# Patient Record
Sex: MALE | Race: WHITE | Employment: FULL TIME | URBAN - METROPOLITAN AREA
[De-identification: names, ages, dates, MRNs, and addresses within clinical notes are randomized per-mention and may not be internally consistent; named-entity substitution may affect disease eponyms.]

---

## 2017-04-18 ENCOUNTER — HOSPITAL ENCOUNTER (OUTPATIENT)
Dept: ULTRASOUND IMAGING | Age: 67
Discharge: HOME | End: 2017-04-18
Attending: INTERNAL MEDICINE
Payer: COMMERCIAL

## 2017-04-18 DIAGNOSIS — N18.30 CHRONIC KIDNEY DISEASE, STAGE III (MODERATE) (HCC): ICD-10-CM

## 2017-04-18 PROCEDURE — 76770 US EXAM ABDO BACK WALL COMP: CPT

## 2017-04-22 ENCOUNTER — HOSPITAL ENCOUNTER (EMERGENCY)
Age: 67
Discharge: HOME | End: 2017-04-22
Attending: FAMILY MEDICINE

## 2017-04-22 VITALS
BODY MASS INDEX: 25.68 KG/M2 | SYSTOLIC BLOOD PRESSURE: 135 MMHG | DIASTOLIC BLOOD PRESSURE: 75 MMHG | TEMPERATURE: 95.5 F | OXYGEN SATURATION: 96 % | RESPIRATION RATE: 18 BRPM | HEIGHT: 66 IN | HEART RATE: 85 BPM | WEIGHT: 159.8 LBS

## 2017-04-22 DIAGNOSIS — S05.01XA CORNEAL ABRASION, RIGHT, INITIAL ENCOUNTER: ICD-10-CM

## 2017-04-22 DIAGNOSIS — H53.8 BLURRED VISION, RIGHT EYE: Primary | ICD-10-CM

## 2017-04-22 RX ORDER — SULFACETAMIDE SODIUM 100 MG/ML
1-2 SOLUTION/ DROPS OPHTHALMIC EVERY 4 HOURS
Qty: 1 BOTTLE | Refills: 0 | Status: SHIPPED | OUTPATIENT
Start: 2017-04-22 | End: 2017-04-27

## 2017-04-22 NOTE — UC PROVIDER NOTE
Patient is a 61 y.o. male presenting with visual problem. The history is provided by the patient. Visual Problems    This is a new problem. The current episode started 3 to 5 hours ago. The problem occurs constantly. The problem has not changed since onset. The right eye is affected. The injury mechanism was none. The patient is experiencing no pain. There is no history of trauma to the eye. There is no known exposure to pink eye. He wears contacts. Associated symptoms include blurred vision. Pertinent negatives include no numbness, no decreased vision, no discharge, no double vision, no foreign body sensation, no photophobia, no eye redness, no nausea, no vomiting, no tingling, no weakness, no itching, no fever, no pain, no blindness and no head injury. Past Medical History:   Diagnosis Date    Hypercholesteremia     Hypertension         History reviewed. No pertinent surgical history. History reviewed. No pertinent family history. Social History     Social History    Marital status:      Spouse name: N/A    Number of children: N/A    Years of education: N/A     Occupational History    Not on file. Social History Main Topics    Smoking status: Never Smoker    Smokeless tobacco: Not on file    Alcohol use Not on file    Drug use: Not on file    Sexual activity: Not on file     Other Topics Concern    Not on file     Social History Narrative    No narrative on file                ALLERGIES: Review of patient's allergies indicates no known allergies. Review of Systems   Constitutional: Negative for fever. Eyes: Positive for blurred vision. Negative for blindness, double vision, photophobia, pain, discharge and redness. Gastrointestinal: Negative for nausea and vomiting. Skin: Negative for itching. Neurological: Negative for tingling, weakness and numbness.        Vitals:    04/22/17 0923   BP: 135/75   Pulse: 85   Resp: 18   Temp: 95.5 °F (35.3 °C)   SpO2: 96% Weight: 72.5 kg (159 lb 12.8 oz)   Height: 5' 6\" (1.676 m)       Physical Exam   Constitutional: He is oriented to person, place, and time. He appears well-developed and well-nourished. Eyes: Conjunctivae and EOM are normal.   Small abrasion noted at 12:00 position of right cornea after staining   Pulmonary/Chest: Effort normal.   Neurological: He is alert and oriented to person, place, and time. Skin: Skin is warm and dry. Psychiatric: He has a normal mood and affect. His behavior is normal. Thought content normal.   Nursing note and vitals reviewed. MDM     Differential Diagnosis; Clinical Impression; Plan:     CLINICAL IMPRESSION:  Blurred vision, right eye  (primary encounter diagnosis)  Corneal abrasion, right, initial encounter    Plan:  1. Bleph 10  2. Pt has appointment with [facility] but will wait to see his own ophthalmologist in 2 days  3. Risk of Significant Complications, Morbidity, and/or Mortality:   Presenting problems: Moderate  Diagnostic procedures: Moderate  Management options:   Moderate  Progress:   Patient progress:  Stable      Procedures

## 2018-01-31 ENCOUNTER — HOSPITAL ENCOUNTER (OUTPATIENT)
Dept: GENERAL RADIOLOGY | Age: 68
Discharge: HOME | End: 2018-01-31
Payer: MEDICARE

## 2018-01-31 DIAGNOSIS — M54.50 LUMBAGO: ICD-10-CM

## 2018-01-31 PROCEDURE — 72052 X-RAY EXAM NECK SPINE 6/>VWS: CPT

## 2018-03-06 ENCOUNTER — HOSPITAL ENCOUNTER (OUTPATIENT)
Dept: MRI IMAGING | Age: 68
Discharge: HOME | End: 2018-03-06
Attending: PAIN MEDICINE
Payer: MEDICARE

## 2018-03-06 DIAGNOSIS — M47.892 OTHER SPONDYLOSIS, CERVICAL REGION: ICD-10-CM

## 2018-03-06 DIAGNOSIS — M43.12 SPONDYLOLISTHESIS OF CERVICAL REGION: ICD-10-CM

## 2018-03-06 PROCEDURE — 72141 MRI NECK SPINE W/O DYE: CPT

## 2022-06-24 ENCOUNTER — OFFICE VISIT (OUTPATIENT)
Dept: ORTHOPEDIC SURGERY | Age: 72
End: 2022-06-24

## 2022-06-24 VITALS — HEIGHT: 66 IN | WEIGHT: 162 LBS | BODY MASS INDEX: 26.03 KG/M2

## 2022-06-24 DIAGNOSIS — M79.642 LEFT HAND PAIN: Primary | ICD-10-CM

## 2022-06-24 DIAGNOSIS — M19.042 ARTHRITIS OF FINGER OF LEFT HAND: ICD-10-CM

## 2022-06-24 PROCEDURE — 99203 OFFICE O/P NEW LOW 30 MIN: CPT | Performed by: ORTHOPAEDIC SURGERY

## 2022-06-24 PROCEDURE — 1123F ACP DISCUSS/DSCN MKR DOCD: CPT | Performed by: ORTHOPAEDIC SURGERY

## 2022-06-24 RX ORDER — AMLODIPINE BESYLATE 10 MG/1
TABLET ORAL
COMMUNITY
Start: 2022-04-16

## 2022-06-24 RX ORDER — DOXAZOSIN 2 MG/1
TABLET ORAL
COMMUNITY
Start: 2022-05-20

## 2022-06-24 RX ORDER — ERGOCALCIFEROL 1.25 MG/1
CAPSULE ORAL
COMMUNITY
Start: 2022-05-10

## 2022-06-24 RX ORDER — FENOFIBRATE 160 MG/1
TABLET ORAL
COMMUNITY
Start: 2022-04-22

## 2022-06-24 RX ORDER — AZILSARTAN KAMEDOXOMIL AND CHLORTHALIDONE 40; 25 MG/1; MG/1
TABLET ORAL
COMMUNITY
Start: 2022-04-13

## 2022-06-24 RX ORDER — PANTOPRAZOLE SODIUM 40 MG/1
TABLET, DELAYED RELEASE ORAL
COMMUNITY
Start: 2022-05-10

## 2022-06-24 RX ORDER — LORATADINE 10 MG/1
TABLET ORAL
COMMUNITY
Start: 2022-05-09

## 2022-06-24 NOTE — PROGRESS NOTES
Jillian Salinas (: 1950) is a 73 y.o. male patient here for evaluation of the following chief complaint(s):  Hand Pain (left hand)       ASSESSMENT/PLAN:  Below is the assessment and plan developed based on review of pertinent history, physical exam, labs, studies, and medications. 1. Left hand pain  -     XR HAND LT AP/LAT; Future  2. 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 For the small finger he does have some soft tissue swelling around the PIP joint and has a 10 degree flexion contracture. He is able to make a full composite fist however. When he attempts to flex the digits fully with the MP joints extended he has some limited range of motion of the PIP joint. He has minimal pain he describes in this joint as well. We discussed treatment options and he states he was referred for possible steroid injection however today he states his pain is minimal in that joint. I discussed risks of steroid injection and other treatment options such as a hand therapy referral.  For now he states he just wants to observe it and I did  him that I am happy to see him back if pain gets worse and we can plan for steroid injection at that time. It is possible that flexion contracture can get worse over time and if he notices that I did send him to hand therapy for referral.    In the meantime he can also use over-the-counter medications if needed for pain control. Patient verbalized understanding and elected to proceed. All questions were answered to the patient's apparent satisfaction. SUBJECTIVE/OBJECTIVE:  HPI    62-year-old male reports the left small finger has some limited range of motion and intermittent pain. Patient reports a gradual onset of symptoms. Duration of problem 4 years, began 2018.   Symptom Severity /10  Symptom Frequency constant        No Known Allergies    Current Outpatient Medications   Medication Sig    amLODIPine (NORVASC) 10 mg tablet     Edarbyclor 40-25 mg per tablet     doxazosin (CARDURA) 2 mg tablet     ergocalciferol (ERGOCALCIFEROL) 1,250 mcg (50,000 unit) capsule     fenofibrate (LOFIBRA) 160 mg tablet     loratadine (CLARITIN) 10 mg tablet     pantoprazole (PROTONIX) 40 mg tablet     OTHER BP, acid reflux, cholesterol meds     No current facility-administered medications for this visit. Social History     Socioeconomic History    Marital status:      Spouse name: Not on file    Number of children: Not on file    Years of education: Not on file    Highest education level: Not on file   Occupational History    Not on file   Tobacco Use    Smoking status: Never Smoker    Smokeless tobacco: Never Used   Vaping Use    Vaping Use: Never used   Substance and Sexual Activity    Alcohol use: Yes    Drug use: Never    Sexual activity: Not on file   Other Topics Concern    Not on file   Social History Narrative    Not on file     Social Determinants of Health     Financial Resource Strain:     Difficulty of Paying Living Expenses: Not on file   Food Insecurity:     Worried About Running Out of Food in the Last Year: Not on file    Teagan of Food in the Last Year: Not on file   Transportation Needs:     Lack of Transportation (Medical): Not on file    Lack of Transportation (Non-Medical):  Not on file   Physical Activity:     Days of Exercise per Week: Not on file    Minutes of Exercise per Session: Not on file   Stress:     Feeling of Stress : Not on file   Social Connections:     Frequency of Communication with Friends and Family: Not on file    Frequency of Social Gatherings with Friends and Family: Not on file    Attends [employer] Services: Not on file    Active Member of Clubs or Organizations: Not on file    Attends Club or Organization Meetings: Not on file    Marital Status: Not on file   Intimate Partner Violence:     Fear of Current or Ex-Partner: Not on file    Emotionally Abused: Not on file    Physically Abused: Not on file    Sexually Abused: Not on file   Housing Stability:     Unable to Pay for Housing in the Last Year: Not on file    Number of Places Lived in the Last Year: Not on file    Unstable Housing in the Last Year: Not on file       History reviewed. No pertinent surgical history. History reviewed. No pertinent family history. Review of Systems    No flowsheet data found. Vitals:  Ht 5' 6\" (1.676 m)   Wt 162 lb (73.5 kg)   BMI 26.15 kg/m²    Estimated body surface area is 1.85 meters squared as calculated from the following:    Height as of this encounter: 5' 6\" (1.676 m). Weight as of this encounter: 162 lb (73.5 kg). Body mass index is 26.15 kg/m². Physical Exam    Musculoskeletal Exam:    Left Upper Extremity EXAMINATION    Patient has very mild tenderness to palpation around the PIP joint and some mild swelling compared to the contralateral side. No crepitus with range of motion. No redness, warmth or erythema. No palpable masses. He does have a 10 degree flexion contracture of that digit. He is able to make a full composite fist.  With the MP joints extended he lacks some PIP flexion. Patient fires AIN, PIN and ulnar nerves. Sensation is grossly intact in the median, radial and ulnar distribution. Hand is pink and appears well-perfused. Hand is warm. Skin is intact. Compartments are soft and compressible.       Consitutional: Healthy  Skin:   - Edema - none  - Cellulitis - No    Neuro: Numbness or tingling in R/L arm: No    Psych: Affect normal    Cardiovascular: Capillary Refill < 2 seconds in upper extremities    Respiratory: Non-Labored Breathing    ROS:    Constitutional: Denies fever/chills    Respiratory: Denies SOB        Imaging:    XR Results (most recent):  Results from Appointment encounter on 06/24/22    XR HAND LT AP/LAT    Narrative  Left Hand Xray  Indication: pain  Views: 2 views, AP/LAT    Interpretation: 2 views of the left hand are reviewed and show severe CMC arthritis with osteophyte formation and cartilage loss. At the PIP joint of the small finger there is some mild soft tissue swelling but no significant arthritis is noted, on the lateral view may be some mild joint space loss but again very mild. There are no fractures or dislocations noted. The carpal alignment is well-maintained with no obvious abnormalities noted. There is no evidence indicating obvious ligament tear noted on these x-rays. Normal bone architecture is noted. Orders Placed This Encounter    XR HAND LT AP/LAT     Standing Status:   Future     Number of Occurrences:   1     Standing Expiration Date:   6/25/2023          An electronic signature was used to authenticate this note.   -- Cami Armstrong MD

## 2022-06-24 NOTE — LETTER
6/24/2022    Patient: Martha Rosenbaum   YOB: 1950   Date of Visit: 6/24/2022     Roseann Brady MD  [address] [facility] [address]  Via Fax: [phone]    Dear Roseann Brady MD,      Thank you for referring Mr. Martha Rosenbaum to [facility] for evaluation. My notes for this consultation are attached. If you have questions, please do not hesitate to call me. I look forward to following your patient along with you.       Sincerely,    Giovanny Navarro MD

## 2023-06-01 ENCOUNTER — HOSPITAL ENCOUNTER (OUTPATIENT)
Facility: HOSPITAL | Age: 73
Setting detail: OUTPATIENT SURGERY
Discharge: HOME | End: 2023-06-01
Attending: SPECIALIST | Admitting: SPECIALIST
Payer: MEDICARE

## 2023-06-01 ENCOUNTER — ANESTHESIA EVENT (OUTPATIENT)
Facility: HOSPITAL | Age: 73
End: 2023-06-01
Payer: MEDICARE

## 2023-06-01 ENCOUNTER — ANESTHESIA (OUTPATIENT)
Facility: HOSPITAL | Age: 73
End: 2023-06-01
Payer: MEDICARE

## 2023-06-01 VITALS
DIASTOLIC BLOOD PRESSURE: 65 MMHG | RESPIRATION RATE: 17 BRPM | OXYGEN SATURATION: 96 % | SYSTOLIC BLOOD PRESSURE: 104 MMHG | HEART RATE: 78 BPM

## 2023-06-01 PROCEDURE — 2709999900 HC NON-CHARGEABLE SUPPLY: Performed by: SPECIALIST

## 2023-06-01 PROCEDURE — 3600007512: Performed by: SPECIALIST

## 2023-06-01 PROCEDURE — 6360000002 HC RX W HCPCS: Performed by: REGISTERED NURSE

## 2023-06-01 PROCEDURE — 3600007502: Performed by: SPECIALIST

## 2023-06-01 PROCEDURE — 88305 TISSUE EXAM BY PATHOLOGIST: CPT

## 2023-06-01 PROCEDURE — 88342 IMHCHEM/IMCYTCHM 1ST ANTB: CPT

## 2023-06-01 PROCEDURE — 7100000010 HC PHASE II RECOVERY - FIRST 15 MIN: Performed by: SPECIALIST

## 2023-06-01 PROCEDURE — 3700000001 HC ADD 15 MINUTES (ANESTHESIA): Performed by: SPECIALIST

## 2023-06-01 PROCEDURE — 6370000000 HC RX 637 (ALT 250 FOR IP): Performed by: SPECIALIST

## 2023-06-01 PROCEDURE — 2580000003 HC RX 258: Performed by: REGISTERED NURSE

## 2023-06-01 PROCEDURE — 7100000011 HC PHASE II RECOVERY - ADDTL 15 MIN: Performed by: SPECIALIST

## 2023-06-01 PROCEDURE — 2500000003 HC RX 250 WO HCPCS: Performed by: REGISTERED NURSE

## 2023-06-01 PROCEDURE — 3700000000 HC ANESTHESIA ATTENDED CARE: Performed by: SPECIALIST

## 2023-06-01 PROCEDURE — 2580000003 HC RX 258: Performed by: SPECIALIST

## 2023-06-01 RX ORDER — SIMETHICONE 20 MG/.3ML
40 EMULSION ORAL EVERY 6 HOURS PRN
Status: DISCONTINUED | OUTPATIENT
Start: 2023-06-01 | End: 2023-06-01 | Stop reason: HOSPADM

## 2023-06-01 RX ORDER — SODIUM CHLORIDE 9 MG/ML
25 INJECTION, SOLUTION INTRAVENOUS PRN
Status: DISCONTINUED | OUTPATIENT
Start: 2023-06-01 | End: 2023-06-01 | Stop reason: HOSPADM

## 2023-06-01 RX ORDER — SODIUM CHLORIDE 9 MG/ML
INJECTION, SOLUTION INTRAVENOUS CONTINUOUS PRN
Status: DISCONTINUED | OUTPATIENT
Start: 2023-06-01 | End: 2023-06-01 | Stop reason: SDUPTHER

## 2023-06-01 RX ORDER — LIDOCAINE HYDROCHLORIDE 20 MG/ML
INJECTION, SOLUTION EPIDURAL; INFILTRATION; INTRACAUDAL; PERINEURAL PRN
Status: DISCONTINUED | OUTPATIENT
Start: 2023-06-01 | End: 2023-06-01 | Stop reason: SDUPTHER

## 2023-06-01 RX ORDER — GLYCOPYRROLATE 0.2 MG/ML
INJECTION INTRAMUSCULAR; INTRAVENOUS PRN
Status: DISCONTINUED | OUTPATIENT
Start: 2023-06-01 | End: 2023-06-01 | Stop reason: SDUPTHER

## 2023-06-01 RX ORDER — SODIUM CHLORIDE 0.9 % (FLUSH) 0.9 %
5-40 SYRINGE (ML) INJECTION PRN
Status: DISCONTINUED | OUTPATIENT
Start: 2023-06-01 | End: 2023-06-01 | Stop reason: HOSPADM

## 2023-06-01 RX ORDER — SODIUM CHLORIDE 0.9 % (FLUSH) 0.9 %
5-40 SYRINGE (ML) INJECTION EVERY 12 HOURS SCHEDULED
Status: DISCONTINUED | OUTPATIENT
Start: 2023-06-01 | End: 2023-06-01 | Stop reason: HOSPADM

## 2023-06-01 RX ADMIN — SODIUM CHLORIDE: 9 INJECTION, SOLUTION INTRAVENOUS at 11:16

## 2023-06-01 RX ADMIN — SODIUM CHLORIDE 25 ML: 9 INJECTION, SOLUTION INTRAVENOUS at 11:08

## 2023-06-01 RX ADMIN — PROPOFOL 60 MG: 10 INJECTION, EMULSION INTRAVENOUS at 11:30

## 2023-06-01 RX ADMIN — PROPOFOL 100 MG: 10 INJECTION, EMULSION INTRAVENOUS at 11:24

## 2023-06-01 RX ADMIN — LIDOCAINE HYDROCHLORIDE 90 MG: 20 INJECTION, SOLUTION EPIDURAL; INFILTRATION; INTRACAUDAL; PERINEURAL at 11:24

## 2023-06-01 RX ADMIN — GLYCOPYRROLATE 0.2 MG: 0.2 INJECTION, SOLUTION INTRAMUSCULAR; INTRAVENOUS at 11:24

## 2023-06-01 RX ADMIN — PROPOFOL 50 MG: 10 INJECTION, EMULSION INTRAVENOUS at 11:39

## 2023-06-01 RX ADMIN — PROPOFOL 30 MG: 10 INJECTION, EMULSION INTRAVENOUS at 11:26

## 2023-06-01 RX ADMIN — PROPOFOL 30 MG: 10 INJECTION, EMULSION INTRAVENOUS at 11:25

## 2023-06-01 RX ADMIN — PROPOFOL 50 MG: 10 INJECTION, EMULSION INTRAVENOUS at 11:35

## 2023-06-01 RX ADMIN — PROPOFOL 40 MG: 10 INJECTION, EMULSION INTRAVENOUS at 11:28

## 2023-06-01 RX ADMIN — PROPOFOL 40 MG: 10 INJECTION, EMULSION INTRAVENOUS at 11:32

## 2023-06-01 RX ADMIN — SIMETHICONE 40 MG: 20 SUSPENSION/ DROPS ORAL at 12:04

## 2023-06-01 NOTE — ANESTHESIA PRE PROCEDURE
Department of Anesthesiology  Preprocedure Note       Name:  Chava Dougherty   Age:  81 y.o.  :  1950                                          MRN:  [id number]         Date:  2023      Surgeon: Jack Zuleta):  Justino Lujan MD    Procedure: Procedure(s):  EGD ESOPHAGOGASTRODUODENOSCOPY  COLONOSCOPY DIAGNOSTIC    Medications prior to admission:   Prior to Admission medications    Medication Sig Start Date End Date Taking? Authorizing Provider   amLODIPine (NORVASC) 10 MG tablet ceived the following from [facility]: Outside name: amLODIPine (NORVASC) 10 mg tablet 22   Ar Automatic Reconciliation   Azilsartan-Chlorthalidone (EDARBYCLOR) 40-25 MG TABS ceived the following from [place] [address] [facility]: Outside name: Edarbyclor 40-25 mg per tablet 22   Ar Automatic Reconciliation   doxazosin (CARDURA) 2 MG tablet ceived the following from [place] [address] [facility]: Outside name: doxazosin (CARDURA) 2 mg tablet 22   Ar Automatic Reconciliation   ergocalciferol (ERGOCALCIFEROL) 1.25 MG (37709 UT) capsule ceived the following from [place] [address] [facility]: Outside name: ergocalciferol (ERGOCALCIFEROL) 1,250 mcg (50,000 unit) capsule 5/10/22   Ar Automatic Reconciliation   fenofibrate (TRIGLIDE) 160 MG tablet ceived the following from [place] [address] [facility]: Outside name: fenofibrate (LOFIBRA) 160 mg tablet 22   Ar Automatic Reconciliation   loratadine (CLARITIN) 10 MG tablet ceived the following from [place] [address] [facility]: Outside name: loratadine (CLARITIN) 10 mg tablet 22   Ar Automatic Reconciliation   pantoprazole (PROTONIX) 40 MG tablet ceived the following from [place] [address] [facility]: Outside name: pantoprazole (PROTONIX) 40 mg tablet 5/10/22   Ar Automatic Reconciliation       Current medications:    No current facility-administered medications for this encounter.      Current Outpatient Medications   Medication Sig Dispense Refill   

## 2023-06-01 NOTE — PROCEDURES
Esophagogastroduodenoscopy Procedure Note      Toby Alexander  1950  [id number]    Indication: GERD     Endoscopist: Stephon Hunt MD    Referring Provider:  Mary Carmen Centeno MD    Sedation:  MAC anesthesia Propofol    Procedure Details:  After infomed consent was obtained for the procedure, with all risks and benefits of procedure explained the patient was taken to the endoscopy suite and placed in the left lateral decubitus position. Following sequential administration of sedation as per above, the endoscope was inserted into the mouth and advanced under direct vision to second portion of the duodenum. A careful inspection was made as the gastroscope was withdrawn, including a retroflexed view of the proximal stomach; findings and interventions are described below. Findings:     Esophagus:   + There was an irregular Z line with columnar islands present located at 39 cm from in the incisors s/p Bxs to r/o Grady's Esophagus.  + Small 2 cm Hiatal Hernia. Stomach:   + Mild erythema in the antrum s/p Bx. Findings c/w nonspecific gastritis. Duodenum:   - The bulb and post bulbar mucosa is normal in appearance to the second portion. The duodenal folds appeared normal.  Cold forceps biopsies to r/o celiac. Therapies:  see above    Specimen: Specimens were collected as described and send to the laboratory. Complications:   None were encountered during the procedure. EBL: < 10 ml.           Recommendations:   -F/U Path  -acid suppression, GERD diet    Stephon Hunt MD  6/1/2023  11:46 AM

## 2023-06-01 NOTE — H&P
Gastroenterology Outpatient History and Physical    Patient: Toby Alexander    Physician: Stephon Hunt MD    Vital Signs: There were no vitals taken for this visit. Allergies: Not on File    Chief Complaint: GERD, Diarrhea    History of Present Illness:   82 yo WM who has had soft stools/diarrhea since the cold weather months. Starts with a soft stool that wakes him up and there is urgency. Then about an hour after that around the breakfast he will have about 2-3 more BMs. Then calms down in the evening. Will not have much more in the evening but eats a small meal.   Sxs present for 4-5 months. He also gets a \"gas pain\" and once he passes stool will resolve. No changes in diet or dietary triggers. He finds that probiotic and imodium both help but don't stop the diarrhea. No blood in stool. He has been on pantoprazole 40 mg daily for years from PCP. No prior EGD. Last colonoscopy 11/13/12 Dr. Jason Crabtree: Formed stool in the cecum and ascending colon. Drinks a couple drinks in the evening: bourbon and water. [place] water. No travel. No weight loss. Justification for Procedure: above    History:  Past Medical History:   Diagnosis Date    Arthritis     Hypercholesteremia     Hypertension     History reviewed. No pertinent surgical history. Social History     Socioeconomic History    Marital status:      Spouse name: None    Number of children: None    Years of education: None    Highest education level: None   Tobacco Use    Smoking status: Never    Smokeless tobacco: Never   Substance and Sexual Activity    Alcohol use: Yes    Drug use: Never    History reviewed. No pertinent family history. Medications:   Prior to Admission medications    Medication Sig Start Date End Date Taking?  Authorizing Provider   amLODIPine (NORVASC) 10 MG tablet ceived the following from [facility]: Outside name: amLODIPine (NORVASC) 10 mg tablet 4/16/22   Ar Automatic Reconciliation

## 2023-06-01 NOTE — PROGRESS NOTES
Endoscopy Case End Note:    1143:  Procedure scope was pre-cleaned, per protocol, at bedside by Anisha Parks. 1147:  Report received from anesthesia - KARLIE White. See anesthesia flowsheet for intra-procedure vital signs and events. 1150:  glasses returned to patient.

## 2023-06-01 NOTE — PROCEDURES
Colonoscopy Procedure Note    Indications:   Diarrhea    Referring Physician: Seven Spivey MD  Anesthesia/Sedation: MAC anesthesia Propofol  Endoscopist:  Dr. Nayeli Arias    Procedure in Detail:  Informed consent was obtained for the procedure, including sedation. Risks of perforation, hemorrhage, adverse drug reaction, and aspiration were discussed. The patient was placed in the left lateral decubitus position. Based on the pre-procedure assessment, including review of the patient's medical history, medications, allergies, and review of systems, he had been deemed to be an appropriate candidate for moderate sedation; he was therefore sedated with the medications listed above. The patient was monitored continuously with ECG tracing, pulse oximetry, blood pressure monitoring, and direct observations. A rectal examination was performed. The [id number] was inserted into the rectum and advanced under direct vision to the terminal ileum. The quality of the colonic preparation was adequate. A careful inspection was made as the colonoscope was withdrawn, including a retroflexed view of the rectum; findings and interventions are described below. Appropriate photodocumentation was obtained. Findings:    Scope advanced to the cecum and terminal ileum. S/P Random bx of the TI and then R and L colon sent in 2 separate jars. Normal mucosa visualized throughout. No polyps seen. Small internal hemorrhoids. Therapies:  see above    Specimen: Specimens were collected as described above and sent to pathology. Complications: None were encountered during the procedure.      EBL: < 10 ml    Recommendations:   -F/U Path  -F/U post procedure      Signed By: Nayeli Arias MD                        June 1, 2023

## 2023-06-01 NOTE — ANESTHESIA POSTPROCEDURE EVALUATION
Department of Anesthesiology  Postprocedure Note    Patient: Haja Cullen  MRN: [id number]  YOB: 1950  Date of evaluation: 6/1/2023      Procedure Summary     Date: 06/01/23 Room / Location: [facility] ENDO 04 / [facility] ENDOSCOPY    Anesthesia Start: 1116 Anesthesia Stop: 1148    Procedures:       EGD ESOPHAGOGASTRODUODENOSCOPY (Upper GI Region)      COLONOSCOPY DIAGNOSTIC (Lower GI Region) Diagnosis:       Gastroesophageal reflux disease, unspecified whether esophagitis present      Diarrhea, unspecified type      (Gastroesophageal reflux disease, unspecified whether esophagitis present [K21.9])      (Diarrhea, unspecified type [R19.7])    Surgeons: Anna Pizano MD Responsible Provider: All Frye MD    Anesthesia Type: MAC ASA Status: 3          Anesthesia Type: MAC    Nestor Phase I: Nestor Score: 10    Nestor Phase II: Nestor Score: 10      Anesthesia Post Evaluation    Patient location during evaluation: PACU  Patient participation: complete - patient participated  Level of consciousness: sleepy but conscious and responsive to verbal stimuli  Pain score: 1  Airway patency: patent  Nausea & Vomiting: no vomiting and no nausea  Complications: no  Cardiovascular status: blood pressure returned to baseline and hemodynamically stable  Respiratory status: acceptable  Hydration status: stable

## 2024-06-19 ENCOUNTER — HOSPITAL ENCOUNTER (OUTPATIENT)
Facility: HOSPITAL | Age: 74
Discharge: HOME | End: 2024-06-22
Attending: ORTHOPAEDIC SURGERY
Payer: MEDICARE

## 2024-06-19 DIAGNOSIS — R29.898 RIGHT ARM WEAKNESS: ICD-10-CM

## 2024-06-19 DIAGNOSIS — M54.12 CERVICAL RADICULOPATHY: ICD-10-CM

## 2024-06-19 PROCEDURE — 72141 MRI NECK SPINE W/O DYE: CPT

## 2024-08-02 ENCOUNTER — HOSPITAL ENCOUNTER (OUTPATIENT)
Age: 74
End: 2024-08-02
Payer: MEDICARE

## 2024-08-02 DIAGNOSIS — R29.898 RIGHT ARM WEAKNESS: ICD-10-CM

## 2024-08-02 DIAGNOSIS — G89.29 CHRONIC RIGHT SHOULDER PAIN: ICD-10-CM

## 2024-08-02 DIAGNOSIS — M25.511 CHRONIC RIGHT SHOULDER PAIN: ICD-10-CM

## 2024-08-02 PROCEDURE — 73221 MRI JOINT UPR EXTREM W/O DYE: CPT

## (undated) DEVICE — SINGLE-USE BIOPSY FORCEPS: Brand: RADIAL JAW 4

## (undated) DEVICE — SYSTEM REPROC CBL 3 LD DISPOSABLE

## (undated) DEVICE — CATHETER IV 22GA L1IN OD0.8382-0.9144MM ID0.6096-0.6858MM

## (undated) DEVICE — CATHETER IV 20GA L1.16IN OD1.0414-1.1176MM ID0.762-0.8382MM

## (undated) DEVICE — CATHETER IV 24GA L0.75IN OD0.6604-0.7366MM

## (undated) DEVICE — ENDOSCOPIC KIT COMPLIANCE ENDOKIT

## (undated) DEVICE — CATHETER IV 18GA L1.16IN OD1.27-1.3462MM ID0.9398-1.016MM

## (undated) DEVICE — CONTAINER SPEC 20 ML LID NEUT BUFF FORMALIN 10 % POLYPR STS

## (undated) DEVICE — IV START KIT: Brand: MEDLINE

## (undated) DEVICE — CUFF BLD PRSS AD CLTH SGL TB W/ BAYNT CONN ROUNDED CORNER